# Patient Record
Sex: MALE | Race: WHITE | NOT HISPANIC OR LATINO
[De-identification: names, ages, dates, MRNs, and addresses within clinical notes are randomized per-mention and may not be internally consistent; named-entity substitution may affect disease eponyms.]

---
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
## 2020-12-28 ENCOUNTER — TRANSCRIPTION ENCOUNTER (OUTPATIENT)
Age: 20
End: 2020-12-28

## 2021-01-29 ENCOUNTER — TRANSCRIPTION ENCOUNTER (OUTPATIENT)
Age: 21
End: 2021-01-29

## 2021-06-21 ENCOUNTER — TRANSCRIPTION ENCOUNTER (OUTPATIENT)
Age: 21
End: 2021-06-21

## 2021-11-27 ENCOUNTER — TRANSCRIPTION ENCOUNTER (OUTPATIENT)
Age: 21
End: 2021-11-27

## 2022-04-07 ENCOUNTER — TRANSCRIPTION ENCOUNTER (OUTPATIENT)
Age: 22
End: 2022-04-07

## 2022-06-22 ENCOUNTER — NON-APPOINTMENT (OUTPATIENT)
Age: 22
End: 2022-06-22

## 2022-07-18 ENCOUNTER — APPOINTMENT (OUTPATIENT)
Dept: OPHTHALMOLOGY | Facility: CLINIC | Age: 22
End: 2022-07-18

## 2022-07-18 ENCOUNTER — NON-APPOINTMENT (OUTPATIENT)
Age: 22
End: 2022-07-18

## 2022-07-18 PROCEDURE — 92002 INTRM OPH EXAM NEW PATIENT: CPT

## 2022-08-01 ENCOUNTER — APPOINTMENT (OUTPATIENT)
Dept: OPHTHALMOLOGY | Facility: CLINIC | Age: 22
End: 2022-08-01

## 2022-08-01 ENCOUNTER — NON-APPOINTMENT (OUTPATIENT)
Age: 22
End: 2022-08-01

## 2022-08-01 PROCEDURE — 92014 COMPRE OPH EXAM EST PT 1/>: CPT

## 2023-02-01 PROBLEM — Z00.00 ENCOUNTER FOR PREVENTIVE HEALTH EXAMINATION: Status: ACTIVE | Noted: 2023-02-01

## 2023-02-06 ENCOUNTER — APPOINTMENT (OUTPATIENT)
Dept: ORTHOPEDIC SURGERY | Facility: CLINIC | Age: 23
End: 2023-02-06
Payer: COMMERCIAL

## 2023-02-06 VITALS — WEIGHT: 170 LBS | BODY MASS INDEX: 22.53 KG/M2 | HEIGHT: 73 IN

## 2023-02-06 PROCEDURE — 73564 X-RAY EXAM KNEE 4 OR MORE: CPT | Mod: LT

## 2023-02-06 PROCEDURE — 99213 OFFICE O/P EST LOW 20 MIN: CPT

## 2023-02-06 RX ORDER — FLUOXETINE HYDROCHLORIDE 10 MG/1
10 CAPSULE ORAL
Refills: 0 | Status: ACTIVE | COMMUNITY

## 2023-02-06 NOTE — DISCUSSION/SUMMARY
[de-identified] : Will begin PT for IT band syndrome.\par Ant inflammatory.\par Follow up as needed.  \par ----------------------------------------------------------------------------\par Home Exercise \par \par  The patient is instructed on a home exercise program. \par  \par RICE \par I explained to the patient that rest, ice, compression, and elevation would benefit them.  They may return to activity after follow-up or when they no longer have any pain. \par  \par Pain Guide Activities \par The patient was advised to let pain guide the gradual advancement of activities. \par  \par Activity Modification \par The patient was advised to modify their activities. \par  \par Dx / Natural History \par The patient was advised of the diagnosis.  The natural history of the pathology was explained in full to the patient in layman's terms.  Several different treatment options were discussed and explained in full to the patient including the risks and benefits of both surgical and non-surgical treatments.  All questions and concerns were answered.\par \par "Written by Conrad Ng, acting as Scribe for Golden Ponce M.D"\par

## 2023-02-06 NOTE — PHYSICAL EXAM
[de-identified] : Neurologic: normal coordination, normal DTR UE/LE , normal sensation, normal mood and affect, orientated and able to communicate.\par \par Skin: normal skin, no rash, no ulcers and no lesions.\par \par Constitutional: well developed and well nourished.\par \par ] [5___] : hamstring 5[unfilled]/5 [] : patient ambulates without assistive device [Left] : left knee [All Views] : anteroposterior, lateral, skyline, and anteroposterior standing [There are no fractures, subluxations or dislocations. No significant abnormalities are seen] : There are no fractures, subluxations or dislocations. No significant abnormalities are seen

## 2023-02-06 NOTE — PHYSICAL EXAM
[de-identified] : Neurologic: normal coordination, normal DTR UE/LE , normal sensation, normal mood and affect, orientated and able to communicate.\par \par Skin: normal skin, no rash, no ulcers and no lesions.\par \par Constitutional: well developed and well nourished.\par \par ] [5___] : hamstring 5[unfilled]/5 [] : patient ambulates without assistive device [Left] : left knee [All Views] : anteroposterior, lateral, skyline, and anteroposterior standing [There are no fractures, subluxations or dislocations. No significant abnormalities are seen] : There are no fractures, subluxations or dislocations. No significant abnormalities are seen

## 2023-02-06 NOTE — DISCUSSION/SUMMARY
[de-identified] : Will begin PT for IT band syndrome.\par Ant inflammatory.\par Follow up as needed.  \par ----------------------------------------------------------------------------\par Home Exercise \par \par  The patient is instructed on a home exercise program. \par  \par RICE \par I explained to the patient that rest, ice, compression, and elevation would benefit them.  They may return to activity after follow-up or when they no longer have any pain. \par  \par Pain Guide Activities \par The patient was advised to let pain guide the gradual advancement of activities. \par  \par Activity Modification \par The patient was advised to modify their activities. \par  \par Dx / Natural History \par The patient was advised of the diagnosis.  The natural history of the pathology was explained in full to the patient in layman's terms.  Several different treatment options were discussed and explained in full to the patient including the risks and benefits of both surgical and non-surgical treatments.  All questions and concerns were answered.\par \par "Written by Conrad Ng, acting as Scribe for Golden Ponce M.D"\par

## 2023-02-06 NOTE — HISTORY OF PRESENT ILLNESS
[de-identified] : The patient is a 22 year male  year old _ hand dominant who presents today complaining of left lateral knee pain. Patient has been having pain in the knee when walking up and down stairs. Pain may have began from squatting at the gym. He has been resting the knee for the past few weeks, the pain has not gone away.\par Date of Injury/Onset: _\par Pain:    At Rest: _/10 \par With Activity:  _/10 \par Mechanism of injury: _\par This is _ a Work Related Injury being treated under Worker's Compensation.\par This is _ an athletic injury occurring associated with an interscholastic or organized sports team.\par Quality of symptoms: _\par Improves with: _\par Worse with: _\par Prior treatment: _\par Prior Imaging: _\par Out of work/sport: _, since _\par School/Sport/Position/Occupation:_\par Additional Information: None \par

## 2023-02-06 NOTE — HISTORY OF PRESENT ILLNESS
[de-identified] : The patient is a 22 year male  year old _ hand dominant who presents today complaining of left lateral knee pain. Patient has been having pain in the knee when walking up and down stairs. Pain may have began from squatting at the gym. He has been resting the knee for the past few weeks, the pain has not gone away.\par Date of Injury/Onset: _\par Pain:    At Rest: _/10 \par With Activity:  _/10 \par Mechanism of injury: _\par This is _ a Work Related Injury being treated under Worker's Compensation.\par This is _ an athletic injury occurring associated with an interscholastic or organized sports team.\par Quality of symptoms: _\par Improves with: _\par Worse with: _\par Prior treatment: _\par Prior Imaging: _\par Out of work/sport: _, since _\par School/Sport/Position/Occupation:_\par Additional Information: None \par

## 2023-07-12 ENCOUNTER — NON-APPOINTMENT (OUTPATIENT)
Age: 23
End: 2023-07-12

## 2024-08-18 ENCOUNTER — NON-APPOINTMENT (OUTPATIENT)
Age: 24
End: 2024-08-18